# Patient Record
Sex: FEMALE | Race: BLACK OR AFRICAN AMERICAN | NOT HISPANIC OR LATINO | Employment: FULL TIME | URBAN - METROPOLITAN AREA
[De-identification: names, ages, dates, MRNs, and addresses within clinical notes are randomized per-mention and may not be internally consistent; named-entity substitution may affect disease eponyms.]

---

## 2023-03-15 LAB
CHLAMYDIA TRACH., AMPLIFIED: NEGATIVE
N. GONORRHEA, AMPLIFIED: NEGATIVE
TRICHOMONAS VAGINALIS: NEGATIVE

## 2024-04-05 ENCOUNTER — HOSPITAL ENCOUNTER (OUTPATIENT)
Dept: RADIOLOGY | Facility: CLINIC | Age: 29
Discharge: HOME | End: 2024-04-05
Payer: COMMERCIAL

## 2024-04-05 DIAGNOSIS — S99.911A UNSPECIFIED INJURY OF RIGHT ANKLE, INITIAL ENCOUNTER: ICD-10-CM

## 2024-04-05 PROCEDURE — 73610 X-RAY EXAM OF ANKLE: CPT | Mod: RIGHT SIDE | Performed by: RADIOLOGY

## 2024-04-05 PROCEDURE — 73610 X-RAY EXAM OF ANKLE: CPT | Mod: RT

## 2024-04-05 PROCEDURE — 73630 X-RAY EXAM OF FOOT: CPT | Mod: RT

## 2024-04-05 PROCEDURE — 73630 X-RAY EXAM OF FOOT: CPT | Mod: RIGHT SIDE | Performed by: RADIOLOGY

## 2024-04-09 ENCOUNTER — HOSPITAL ENCOUNTER (EMERGENCY)
Facility: HOSPITAL | Age: 29
Discharge: HOME | End: 2024-04-09
Attending: EMERGENCY MEDICINE
Payer: COMMERCIAL

## 2024-04-09 VITALS
DIASTOLIC BLOOD PRESSURE: 90 MMHG | BODY MASS INDEX: 28.25 KG/M2 | TEMPERATURE: 97.5 F | HEIGHT: 67 IN | OXYGEN SATURATION: 100 % | HEART RATE: 85 BPM | WEIGHT: 180 LBS | SYSTOLIC BLOOD PRESSURE: 140 MMHG | RESPIRATION RATE: 18 BRPM

## 2024-04-09 DIAGNOSIS — R04.0 NOSEBLEED: Primary | ICD-10-CM

## 2024-04-09 PROCEDURE — 30901 CONTROL OF NOSEBLEED: CPT | Mod: RT

## 2024-04-09 PROCEDURE — 30903 CONTROL OF NOSEBLEED: CPT | Performed by: EMERGENCY MEDICINE

## 2024-04-09 PROCEDURE — 99283 EMERGENCY DEPT VISIT LOW MDM: CPT

## 2024-04-09 PROCEDURE — 99283 EMERGENCY DEPT VISIT LOW MDM: CPT | Performed by: EMERGENCY MEDICINE

## 2024-04-09 PROCEDURE — 2500000001 HC RX 250 WO HCPCS SELF ADMINISTERED DRUGS (ALT 637 FOR MEDICARE OP)

## 2024-04-09 RX ORDER — OXYMETAZOLINE HCL 0.05 %
2 SPRAY, NON-AEROSOL (ML) NASAL ONCE
Status: COMPLETED | OUTPATIENT
Start: 2024-04-09 | End: 2024-04-09

## 2024-04-09 RX ORDER — CEPHALEXIN 500 MG/1
500 CAPSULE ORAL 3 TIMES DAILY
Qty: 15 CAPSULE | Refills: 0 | Status: SHIPPED | OUTPATIENT
Start: 2024-04-09 | End: 2024-04-14

## 2024-04-09 RX ADMIN — Medication 2 SPRAY: at 04:19

## 2024-04-09 ASSESSMENT — PAIN - FUNCTIONAL ASSESSMENT: PAIN_FUNCTIONAL_ASSESSMENT: 0-10

## 2024-04-09 ASSESSMENT — PAIN DESCRIPTION - PROGRESSION: CLINICAL_PROGRESSION: NOT CHANGED

## 2024-04-09 ASSESSMENT — COLUMBIA-SUICIDE SEVERITY RATING SCALE - C-SSRS
6. HAVE YOU EVER DONE ANYTHING, STARTED TO DO ANYTHING, OR PREPARED TO DO ANYTHING TO END YOUR LIFE?: NO
1. IN THE PAST MONTH, HAVE YOU WISHED YOU WERE DEAD OR WISHED YOU COULD GO TO SLEEP AND NOT WAKE UP?: NO
2. HAVE YOU ACTUALLY HAD ANY THOUGHTS OF KILLING YOURSELF?: NO

## 2024-04-09 ASSESSMENT — LIFESTYLE VARIABLES
HAVE YOU EVER FELT YOU SHOULD CUT DOWN ON YOUR DRINKING: NO
HAVE PEOPLE ANNOYED YOU BY CRITICIZING YOUR DRINKING: NO
EVER HAD A DRINK FIRST THING IN THE MORNING TO STEADY YOUR NERVES TO GET RID OF A HANGOVER: NO
EVER FELT BAD OR GUILTY ABOUT YOUR DRINKING: NO
TOTAL SCORE: 0

## 2024-04-09 ASSESSMENT — PAIN SCALES - GENERAL: PAINLEVEL_OUTOF10: 0 - NO PAIN

## 2024-04-09 NOTE — DISCHARGE INSTRUCTIONS
You were evaluated after a nosebleed.  Keep the packing devices in for the next 2 days.  Take Keflex 500 mg 3 times daily for the next 5 days to prevent infection.  If you are unable to get an appointment with ENT in 2 days, come to the ER to have these devices removed.  If the bleeding resumes despite the packing, return to the ER.

## 2024-04-09 NOTE — ED PROVIDER NOTES
EMERGENCY DEPARTMENT ENCOUNTER      Pt Name: Zan Lozano  MRN: [id number]  Birthdate 1995  Date of evaluation: 4/9/2024  Provider: Tulio Hardin DO    CHIEF COMPLAINT       Chief Complaint   Patient presents with    Epistaxis (Nose Bleed)     12:45 started - has slowed down for a bit and then picked back up again   Used flonase nasal spray and claritin     Nasal Congestion    Cough         HISTORY OF PRESENT ILLNESS    HPI  Patient is a 28-year-old female presenting with nosebleed.  She had cold/flu symptoms off and on for the past week including runny nose, congestion, dry cough.  Bleeding started approximately 12:30 AM today.  It slowed down after she iced it after a while until pressure but then picked back up after which she presented to the emergency department.  She is otherwise asymptomatic.    Nursing Notes were reviewed.    PAST MEDICAL HISTORY   History reviewed. No pertinent past medical history.      SURGICAL HISTORY     History reviewed. No pertinent surgical history.      CURRENT MEDICATIONS       Discharge Medication List as of 4/9/2024  6:49 AM          ALLERGIES     Lactase, Milk, Amoxicillin, and Lactose    FAMILY HISTORY     No family history on file.       SOCIAL HISTORY       Social History     Socioeconomic History    Marital status: Single     Spouse name: None    Number of children: None    Years of education: None    Highest education level: None   Occupational History    None   Tobacco Use    Smoking status: Never    Smokeless tobacco: Never   Vaping Use    Vaping status: Never Used   Substance and Sexual Activity    Alcohol use: Yes     Comment: socially    Drug use: Never    Sexual activity: None   Other Topics Concern    None   Social History Narrative    None     Social Determinants of Health     Financial Resource Strain: Not on file   Food Insecurity: Not on file   Transportation Needs: Not on file   Physical Activity: Not on file   Stress: Not on file   Social  Connections: Not on file   Intimate Partner Violence: Not on file   Housing Stability: Not on file       SCREENINGS                        PHYSICAL EXAM    (up to 7 for level 4, 8 or more for level 5)     ED Triage Vitals [04/09/24 0334]   Temperature Heart Rate Respirations BP   36.4 °C (97.5 °F) 88 18 131/82      Pulse Ox Temp Source Heart Rate Source Patient Position   99 % Temporal Monitor Sitting      BP Location FiO2 (%)     Right arm --       Physical Exam  Vitals and nursing note reviewed.   Constitutional:       General: She is not in acute distress.     Appearance: She is not toxic-appearing.   HENT:      Head: Normocephalic and atraumatic.      Nose:      Comments: Nose clamp in place.  Dried blood around the nares     Mouth/Throat:      Mouth: Mucous membranes are moist.      Pharynx: Oropharynx is clear.   Eyes:      Extraocular Movements: Extraocular movements intact.      Conjunctiva/sclera: Conjunctivae normal.   Cardiovascular:      Rate and Rhythm: Normal rate and regular rhythm.      Heart sounds: Normal heart sounds.   Pulmonary:      Effort: Pulmonary effort is normal. No respiratory distress.      Breath sounds: Normal breath sounds.   Abdominal:      General: There is no distension.      Palpations: Abdomen is soft.   Musculoskeletal:         General: No deformity or signs of injury.      Cervical back: Normal range of motion and neck supple.   Skin:     General: Skin is warm and dry.   Neurological:      General: No focal deficit present.          DIAGNOSTIC RESULTS     LABS:  Labs Reviewed - No data to display    All other labs were within normal range or not returned as of this dictation.    Imaging  No orders to display        Procedures  Procedures     EMERGENCY DEPARTMENT COURSE/MDM:     Diagnoses as of 04/13/24 0753   Nosebleed        Medical Decision Making  History obtained for the patient.  Records including labs, imaging, notes reviewed.  We initially attempted packing with Afrin  soaked gauze in the bilateral naris with direct pressure for 15 minutes.  This did not resolve the bleeding.  A Rhino Rocket was then placed to the right nare and a merocel in the left nare.  Patient was given water gargle with no visible posterior bleeding after placement.  Patient was observed for 30 minutes with no repeated bleeding.  Patient subsequently discharged home in satisfactory condition with a referral to ENT and a prescription for Keflex.  All questions answered and return precautions discussed.    Patient and or family in agreement and understanding of treatment plan.  All questions answered.      I reviewed the case with the attending ED physician. The attending ED physician agrees with the plan. Patient and/or patient´s representative was counseled regarding labs, imaging, likely diagnosis, and plan. All questions were answered.    ED Medications administered this visit:    Medications   oxymetazoline (Afrin) 0.05 % nasal spray 2 spray (2 sprays Each Nostril Given 4/9/24 0419)       New Prescriptions from this visit:    Discharge Medication List as of 4/9/2024  6:49 AM        START taking these medications    Details   cephalexin (Keflex) 500 mg capsule Take 1 capsule (500 mg) by mouth 3 times a day for 5 days., Starting Tue 4/9/2024, Until Sun 4/14/2024, Normal             Follow-up:  Jonathan K Frankel, MD  [address] [place] [address] [address] [place] [address] [phone]    Schedule an appointment as soon as possible for a visit in 2 days          Final Impression:   1. Nosebleed          (Please note that portions of this note were completed with a voice recognition program.  Efforts were made to edit the dictations but occasionally words are mis-transcribed.)     Lino Hi MD  Resident  04/09/24 4774    The patient was seen by the resident/fellow.  I have personally performed a substantive portion of the encounter.  I have seen and examined the patient; agree with the workup, evaluation, MDM,  management and diagnosis.  The care plan has been discussed with the resident/fellow; I have reviewed the resident/fellow’s note and agree with the documented findings with the exception/addition of the following:    The patient initially appeared to have bleeding in the left nostril left naris though it was noted on both sides, the left 1 appeared to be acutely bleeding.  There was blood going down the posterior pharynx as well.  Later blow out all the clots from her nose, and the blood in the posterior pharynx stopped however the anterior bleed was still occurring but could not be visualized with the otoscope.  A Merisel packing with Todd-Synephrine was placed in the left naris.  Bleeding initially stopped however several minutes later began out of the right nare.  A Rhino Rocket was placed in the right nare with 1 cc of air as that is all that it took to stop the bleeding, and with the patient to tolerate at the time.  We watched the patient for another 15 minutes, had her gargle with cold water during that time, and there is no blood in the posterior pharynx.  We evaluated again after 20 more minutes, and no blood was noted in the posterior pharynx, no bleeding around the Merisel or the Rhino Rocket.  As the Rhino Rocket with 1 cc of air seems to have stopped the bleeding with the Merisel, no further air was injected into the Rhino Rocket.  Patient was started on antibiotics and was notified to begin taking them if the packing is going to be in for more than 3 days depending on her follow-up visit.  She can either follow-up with ENT in the next 3 days or return to the emergency room to have the nasal packing evaluated for removal.  She is to return to the emergency room for any spitting up of blood or posterior bleeding, or any worsening concerning symptoms otherwise.       Tulio Hardin,   04/13/24 0750

## 2024-04-10 ENCOUNTER — OFFICE VISIT (OUTPATIENT)
Dept: OTOLARYNGOLOGY | Facility: CLINIC | Age: 29
End: 2024-04-10
Payer: COMMERCIAL

## 2024-04-10 DIAGNOSIS — R04.0 EPISTAXIS: Primary | ICD-10-CM

## 2024-04-10 PROCEDURE — 31231 NASAL ENDOSCOPY DX: CPT | Performed by: PHYSICIAN ASSISTANT

## 2024-04-10 RX ORDER — MUPIROCIN 20 MG/G
OINTMENT TOPICAL
Qty: 30 G | Refills: 0 | Status: SHIPPED | OUTPATIENT
Start: 2024-04-10

## 2024-04-10 RX ORDER — OXYMETAZOLINE HYDROCHLORIDE 0.05 G/100ML
2 SPRAY, METERED NASAL EVERY 12 HOURS PRN
Qty: 30 ML | Refills: 2 | Status: SHIPPED | OUTPATIENT
Start: 2024-04-10 | End: 2024-04-12

## 2024-04-10 NOTE — PROGRESS NOTES
HPI   Zan Lozano is a 28 y.o. female who presents for evaluation of epistaxis or nosebleeds that started bilaterally two ago.  The patient has a history of epistaxis. No history of nasal surgery or trauma. No history of uncontrolled hypertension. She denies history of significant exposure to toxic fumes, nickel, or wood dust.    History of prior nasal/sinus surgery or procedure: Denies    Review of Systems   Negative for constitutional, eyes, cardiac, pulmonary, hepatic, renal, digestive, hematologic, epileptic, syncopal, musculoskeletal, mental health, integumentary, hypertensive, lipid, arthritic, diabetic, thyroid or neurologic disorders (except as listed in the HPI, PM and Problem List).    Assessment   Zan Lozano is a 28 y.o. old female with epistaxis localized to bilateral nose s/p Rhinorocket on the left, and foam on the right.    04/10/2024 -   PROCEDURE:   There is a Rhinorocket packing present in the right nostril. This packing was saturated with marcos synephrine. Packing was removed from the right nostril without difficulty. Anterior nasal endoscopy was performed using a 0 degree rigid scope.  The septum was deviated to the right. The inferior turbinates were hypertrophic bilaterally.  The visualized portion of the middle turbinates appeared healthy, the middle meatus is free of purulence, masses, lesions or polyp. There were no prominent vessels isolated along the anterior septum. There were no complications and the patient tolerated the procedure well.  There was a foam in the left nostril which was saturated with marcos synephrine.  Packing was removed from the left nostril without difficulty.  Anterior nasal endoscopy was performed using a 0 degree rigid scope.  The visualized portion of the middle turbinates appeared healthy, the middle meatus is free of purulence, masses, lesions or polyp. There were no prominent vessels isolated along the anterior septum. There were no complications and the  patient tolerated the procedure well.      Plan   Patient was prescribed mupirocin antibiotic ointment to moisturize her nose 2-3 times daily. Patient was instructed to swipe a pea-sized amount into each nostril and sniff back.  Patient was instructed to avoid nose blowing, strenuous physical activity, and lifting objects greater than 10 lbs for the next 2-4 weeks.  Discussed epistaxis prevention and acute treatment - Afrin spray PRN for bleeding, apply pressure for 10 minutes if bleeding occurs.  I recommended the patient use a humidifier in the bedroom and in the house.  Patient will follow up in 1 month(s).    Referring Provider: Tulio Hardin DO  I will provide a report to the referring provider via the electronic medical record or US mail.    Rafy Mortensen PA-C  [facility]      Exam   General: This is a healthy appearing female who appears her stated age. The patient is alert and appropriately verbally conversant without hoarseness.    Face: The face was inspected and no cutaneous masses or lesions were visualized. There was no erythema or edema noted. Facial movement was symmetric without weakness. No skin lesions were detected. There was no sinus tenderness elicited. The parotid and submandibular glands were normal to palpation.    Eyes: Extra-ocular muscle function was intact. No nystagmus was observed. Pupils were equal.    Cranial Nerves: Cranial nerves II, III, IV, and VI were noted to be intact via extra-ocular muscle movement testing. Cranial nerve VII noted to be intact and symmetric by facial movement. Cranial nerve VIII was tested with whispered voice examination and revealed symmetric hearing. Cranial nerves IX and X noted to be intact by gag reflex and palatal movement. Cranial nerve XII noted to be intact by active and symmetric tongue movement.    Nose: Examination of the nose revealed the nasal dorsum to be midline. Intranasal exam reveals the septum was deviated to the right.  The inferior turbinates were hypertrophic. No masses, polyps, mucopus, or other lesion on anterior rhinoscopy. See below procedure note as applicable for further exam.    Oral Cavity: Examination of the oral cavity revealed no mass lesions nor infection. The palate was noted to be intact without evidence of clefting. The tongue exhibited normal mobility. Mucosa was moist without lesion. The lips were free of lesion. Gums were free of inflammation. Dentition: normal without obvious infection or inflammation.    Oropharynx: The oral pharynx was free of mass lesion or mucosal abnormality. The palate was noted to be without lesion. The uvula was normal appearing. The tonsils were normal appearing.    Ears: Examination of the ears revealed that the auricles were normally formed with no lesions. The external auditory canals were cleaned of any obstructing cerumen. The tympanic membranes were intact and freely mobile to pneumatoscopy. There are no significant retraction pockets. There is no inflammation visualized. No effusions are seen.    Neck: Visualization and palpation of the neck revealed no mass lesions, no thyromegaly or thyroid masses. No skin lesions or inflammatory processes were detected. The cervical musculature was normal to palpation.    Cervical Lymphatics: There were no palpable lymph nodes in the posterior triangle, submandibular triangle, jugulodigastric region, or central neck.    CV: peripheral perfusion intact, no cyanosis, clubbing or edema of extremities.    Respiratory: Normal inspiration and expiration and chest wall expansion, no use of accessory muscles to breathe, no stridor or stertor.    Rafy Mortensen PA-C